# Patient Record
Sex: MALE | Race: WHITE
[De-identification: names, ages, dates, MRNs, and addresses within clinical notes are randomized per-mention and may not be internally consistent; named-entity substitution may affect disease eponyms.]

---

## 2020-04-05 ENCOUNTER — HOSPITAL ENCOUNTER (EMERGENCY)
Dept: HOSPITAL 12 - ER | Age: 35
Discharge: HOME | End: 2020-04-05
Payer: SELF-PAY

## 2020-04-05 VITALS — BODY MASS INDEX: 21.19 KG/M2 | HEIGHT: 67 IN | WEIGHT: 135 LBS

## 2020-04-05 VITALS — DIASTOLIC BLOOD PRESSURE: 78 MMHG | SYSTOLIC BLOOD PRESSURE: 122 MMHG

## 2020-04-05 DIAGNOSIS — F11.10: Primary | ICD-10-CM

## 2020-04-05 DIAGNOSIS — Z59.0: ICD-10-CM

## 2020-04-05 DIAGNOSIS — R40.4: ICD-10-CM

## 2020-04-05 PROCEDURE — A4663 DIALYSIS BLOOD PRESSURE CUFF: HCPCS

## 2020-04-05 SDOH — ECONOMIC STABILITY - HOUSING INSECURITY: HOMELESSNESS: Z59.0

## 2020-04-05 NOTE — NUR
Patient BIB [id number] accompanied by [facility] for possible OD. Per report [facility] was 
attempting to give patient ticket for heroin possesion when he states he did 
not feel good and laid himself on the floor. Patient upon arrival refused to 
answer questions. No distress noted. Patient was given one dose of narcan on 
the field.

## 2020-04-05 NOTE — NUR
Patient given written and verbal discharge instructions.  Patient verbalizes 
understanding of instructions.  Patient is ambulatory with steady gait.  
Refuses offer of shelter placement.  Patient given list of available shelters 
in surrounding area. Pt walked out of ER w/ steady gait.